# Patient Record
Sex: FEMALE | Race: WHITE | URBAN - METROPOLITAN AREA
[De-identification: names, ages, dates, MRNs, and addresses within clinical notes are randomized per-mention and may not be internally consistent; named-entity substitution may affect disease eponyms.]

---

## 2017-02-09 ENCOUNTER — TELEPHONE (OUTPATIENT)
Dept: INTERNAL MEDICINE CLINIC | Age: 62
End: 2017-02-09

## 2017-02-09 NOTE — TELEPHONE ENCOUNTER
Pt called, she hasn't been here in 4 years and hasn't made an appmnt as of yet, adv her she will be considered a new pt, she  stated tht she had her last  Colonoscopy on 05/15/08 w/Dr. Soha Back, she wanted to let Dr. Americo Kelly know so this will be added to her chart